# Patient Record
Sex: FEMALE | Race: OTHER | HISPANIC OR LATINO | URBAN - METROPOLITAN AREA
[De-identification: names, ages, dates, MRNs, and addresses within clinical notes are randomized per-mention and may not be internally consistent; named-entity substitution may affect disease eponyms.]

---

## 2013-02-06 LAB — HCV AB SER-ACNC: NEGATIVE

## 2013-08-27 LAB — EXTERNAL HIV CONFIRMATION: NORMAL

## 2020-07-13 ENCOUNTER — HOSPITAL ENCOUNTER (EMERGENCY)
Facility: HOSPITAL | Age: 30
Discharge: HOME | End: 2020-07-13
Attending: EMERGENCY MEDICINE
Payer: COMMERCIAL

## 2020-07-13 VITALS
SYSTOLIC BLOOD PRESSURE: 131 MMHG | OXYGEN SATURATION: 100 % | TEMPERATURE: 96.9 F | WEIGHT: 188.93 LBS | HEART RATE: 76 BPM | DIASTOLIC BLOOD PRESSURE: 84 MMHG | RESPIRATION RATE: 18 BRPM

## 2020-07-13 DIAGNOSIS — Z20.822 CLOSE EXPOSURE TO COVID-19 VIRUS: ICD-10-CM

## 2020-07-13 DIAGNOSIS — J02.9 SORE THROAT: ICD-10-CM

## 2020-07-13 DIAGNOSIS — R05.9 COUGH: Primary | ICD-10-CM

## 2020-07-13 LAB — SARS-COV-2 RNA RESP QL NAA+PROBE: NEGATIVE

## 2020-07-13 PROCEDURE — 99283 EMERGENCY DEPT VISIT LOW MDM: CPT

## 2020-07-13 PROCEDURE — 99282 EMERGENCY DEPT VISIT SF MDM: CPT | Performed by: EMERGENCY MEDICINE

## 2020-07-13 PROCEDURE — 87635 SARS-COV-2 COVID-19 AMP PRB: CPT | Performed by: EMERGENCY MEDICINE

## 2020-07-13 NOTE — ED PROVIDER NOTES
History  Chief Complaint   Patient presents with    Generalized Body Aches     requesting COVID19 testing, sore throat, body aches, headache  58-year-old female presents for evaluation possible COVID-19  The patient states that she had close contact to somebody who tested positive for COVID as recently as 5 days ago  The patient has had sore throat, body aches and headache  The patient states that she also has a mild cough  The patient denies shortness of breath or fevers  Nothing makes her symptoms better or worse   used: Yes        None       History reviewed  No pertinent past medical history  History reviewed  No pertinent surgical history  History reviewed  No pertinent family history  I have reviewed and agree with the history as documented  E-Cigarette/Vaping     E-Cigarette/Vaping Substances     Social History     Tobacco Use    Smoking status: Never Smoker    Smokeless tobacco: Never Used   Substance Use Topics    Alcohol use: Not Currently     Frequency: Never    Drug use: Never       Review of Systems   Constitutional: Negative for chills and fever  HENT: Positive for sore throat  Respiratory: Positive for cough  Negative for shortness of breath  All other systems reviewed and are negative  Physical Exam  Physical Exam   Constitutional: She appears well-developed and well-nourished  No distress  HENT:   Head: Normocephalic and atraumatic  Right Ear: External ear normal    Left Ear: External ear normal    Mouth/Throat: Oropharynx is clear and moist  No oropharyngeal exudate  Eyes: No scleral icterus  Neck: Normal range of motion  Cardiovascular: Normal rate, regular rhythm and normal heart sounds  Pulmonary/Chest: Effort normal  No respiratory distress  Musculoskeletal: Normal range of motion  Neurological: She is alert  Skin: Skin is warm and dry  No rash noted  Psychiatric: She has a normal mood and affect     Nursing note and vitals reviewed  Vital Signs  ED Triage Vitals   Temperature Pulse Respirations Blood Pressure SpO2   07/13/20 1208 07/13/20 1208 07/13/20 1208 07/13/20 1221 07/13/20 1208   (!) 96 9 °F (36 1 °C) 101 18 125/60 99 %      Temp Source Heart Rate Source Patient Position - Orthostatic VS BP Location FiO2 (%)   07/13/20 1208 07/13/20 1208 07/13/20 1427 07/13/20 1427 --   Temporal Monitor Lying Right arm       Pain Score       07/13/20 1208       8           Vitals:    07/13/20 1208 07/13/20 1221 07/13/20 1427   BP:  125/60 131/84   Pulse: 101  76   Patient Position - Orthostatic VS:   Lying         Visual Acuity      ED Medications  Medications - No data to display    Diagnostic Studies  Results Reviewed     Procedure Component Value Units Date/Time    Novel Coronavirus [facility] [[id number]]  (Normal) Collected:  07/13/20 1429    Lab Status:  Final result Specimen:  Nares from Nose Updated:  07/13/20 1533     SARS-CoV-2 Negative    Narrative: The specimen collection materials, transport medium, and/or testing methodology utilized in the production of these test results have been proven to be reliable in a limited validation with an abbreviated program under the Emergency Utilization Authorization provided by the FDA  Testing reported as "Presumptive positive" will be confirmed with secondary testing with a reference laboratory to ensure result accuracy  Clinical caution and judgement should be used with the interpretation of these results with consideration of the clinical impression and other laboratory testing  Testing reported as "Positive" or "Negative" has been proven to be accurate according to standard laboratory validation requirements  All testing is performed with control materials showing appropriate reactivity at standard intervals                     No orders to display              Procedures  Procedures         ED Course       US AUDIT      Most Recent Value   Initial Alcohol Screen: US AUDIT-C    1  How often do you have a drink containing alcohol?  0 Filed at: 07/13/2020 1209   2  How many drinks containing alcohol do you have on a typical day you are drinking? 0 Filed at: 07/13/2020 1209   3b  FEMALE Any Age, or MALE 65+: How often do you have 4 or more drinks on one occassion? 0 Filed at: 07/13/2020 1209   Audit-C Score  0 Filed at: 07/13/2020 1209                  ELICEO/DAST-10      Most Recent Value   How many times in the past year have you    Used an illegal drug or used a prescription medication for non-medical reasons? Never Filed at: 07/13/2020 1209                                MDM  Number of Diagnoses or Management Options  Close exposure to COVID-19 virus:   Cough:   Sore throat:   Diagnosis management comments: Patient with likely viral syndrome  The patient will be tested for COVID as she has a close exposure  She was instructed that she will need a self isolate for minimum of weak even if the COVID-19 test is negative  She will need to s self quarantine for 2 weeks if positive  The patient was provided with these discharge instructions and warnings utilizing [employer]   The patient (and any family present) verbalized understanding of the discharge instructions and warnings that would necessitate return to the Emergency Department      All questions were answered prior to discharge utilizing  services    Patient informed of negative COVID swab       Amount and/or Complexity of Data Reviewed  Clinical lab tests: ordered and reviewed  Review and summarize past medical records: yes          Disposition  Final diagnoses:   Cough   Sore throat   Close exposure to COVID-19 virus     Time reflects when diagnosis was documented in both MDM as applicable and the Disposition within this note     Time User Action Codes Description Comment    7/13/2020  2:20 PM August Escobedo Add [R05] Cough     7/13/2020  2:20 PM Rola NICOLE Add [J02 9] Sore throat     7/13/2020  2:21 PM August Escobedo Add [Z20 828] Close exposure to COVID-19 virus       ED Disposition     ED Disposition Condition Date/Time Comment    Discharge Stable Mon Jul 13, 2020  2:20 PM John Canada discharge to home/self care  Follow-up Information    None         There are no discharge medications for this patient  No discharge procedures on file      PDMP Review     None          ED Provider  Electronically Signed by           Chery Dixon DO  07/13/20 2410

## 2021-12-14 ENCOUNTER — TELEPHONE (OUTPATIENT)
Dept: ADMINISTRATIVE | Facility: OTHER | Age: 31
End: 2021-12-14

## 2021-12-16 ENCOUNTER — OFFICE VISIT (OUTPATIENT)
Dept: FAMILY MEDICINE CLINIC | Facility: CLINIC | Age: 31
End: 2021-12-16
Payer: COMMERCIAL

## 2021-12-16 VITALS
WEIGHT: 196 LBS | DIASTOLIC BLOOD PRESSURE: 80 MMHG | OXYGEN SATURATION: 99 % | TEMPERATURE: 97.1 F | HEART RATE: 90 BPM | BODY MASS INDEX: 38.48 KG/M2 | HEIGHT: 60 IN | SYSTOLIC BLOOD PRESSURE: 122 MMHG | RESPIRATION RATE: 18 BRPM

## 2021-12-16 DIAGNOSIS — Z23 ENCOUNTER FOR IMMUNIZATION: ICD-10-CM

## 2021-12-16 DIAGNOSIS — Z00.00 ANNUAL PHYSICAL EXAM: Primary | ICD-10-CM

## 2021-12-16 PROCEDURE — 99385 PREV VISIT NEW AGE 18-39: CPT | Performed by: PHYSICIAN ASSISTANT

## 2021-12-16 PROCEDURE — 90715 TDAP VACCINE 7 YRS/> IM: CPT

## 2021-12-16 PROCEDURE — 90471 IMMUNIZATION ADMIN: CPT

## 2021-12-21 ENCOUNTER — CONSULT (OUTPATIENT)
Dept: FAMILY MEDICINE CLINIC | Facility: CLINIC | Age: 31
End: 2021-12-21
Payer: COMMERCIAL

## 2021-12-21 VITALS
WEIGHT: 195.6 LBS | HEART RATE: 98 BPM | OXYGEN SATURATION: 99 % | RESPIRATION RATE: 20 BRPM | HEIGHT: 60 IN | DIASTOLIC BLOOD PRESSURE: 72 MMHG | TEMPERATURE: 96 F | SYSTOLIC BLOOD PRESSURE: 118 MMHG | BODY MASS INDEX: 38.4 KG/M2

## 2021-12-21 DIAGNOSIS — Z01.818 PRE-OPERATIVE CLEARANCE: Primary | ICD-10-CM

## 2021-12-21 DIAGNOSIS — E66.09 CLASS 2 OBESITY DUE TO EXCESS CALORIES WITHOUT SERIOUS COMORBIDITY WITH BODY MASS INDEX (BMI) OF 38.0 TO 38.9 IN ADULT: ICD-10-CM

## 2021-12-21 PROCEDURE — 99243 OFF/OP CNSLTJ NEW/EST LOW 30: CPT | Performed by: PHYSICIAN ASSISTANT

## 2021-12-21 PROCEDURE — 93000 ELECTROCARDIOGRAM COMPLETE: CPT | Performed by: PHYSICIAN ASSISTANT

## 2021-12-21 NOTE — PROGRESS NOTES
Presurgical Evaluation    Subjective:      Patient ID: John Canada is a 33 y o  female  Chief Complaint   Patient presents with    Pre-op Exam       Pt presents to the office today for preop clearance for sleeve gastrectomy scheduled for 1/17/22  Pt is electing to undergo the planned procedure with an understanding that all surgery has inherent risk  They have worked with their surgeon and failed conservative treatment measures  Today they present for preoperative risk assessment and recommendations for optimization in preparation for surgery  Pt is Chadian speaking only, video  [id number] provided interpretive services throughout encounter  The following portions of the patient's history were reviewed and updated as appropriate: allergies, current medications, past family history, past medical history, past social history, past surgical history and problem list     Procedure date: January 17, 2022    Surgeon:  Edwar Hoffman MD  Planned procedure:  Sleeve gastrectomy  Diagnosis for procedure:  Class 2 obesity with BMI of 38 0 to 38 9 in adult    Prior anesthesia: Yes   General; Complications:  None / Tolerated well    CAD History: None    Pulmonary History: None    Renal history: None    Diabetes History:  None     Neurological History: None     On Immunosuppressant meds/biologics: No      Review of Systems   Constitutional: Negative for chills, fatigue and fever  HENT: Negative for congestion, rhinorrhea, sinus pressure, sinus pain, sneezing and sore throat  Respiratory: Negative for cough, shortness of breath and wheezing  Cardiovascular: Negative for chest pain, palpitations and leg swelling  Gastrointestinal: Negative for abdominal pain, constipation, diarrhea, nausea and vomiting  Genitourinary: Negative for dysuria and hematuria  Musculoskeletal: Negative for arthralgias, back pain, myalgias, neck pain and neck stiffness     Neurological: Negative for dizziness, seizures and headaches  No current outpatient medications on file  No current facility-administered medications for this visit  Allergies on file:   Patient has no known allergies  Patient Active Problem List   Diagnosis    Pre-operative clearance    Class 2 obesity due to excess calories without serious comorbidity with body mass index (BMI) of 38 0 to 38 9 in adult        History reviewed  No pertinent past medical history  History reviewed  No pertinent surgical history  Family History   Problem Relation Age of Onset    Diabetes Mother     Hypertension Mother     Diabetes Father        Social History     Tobacco Use    Smoking status: Never Smoker    Smokeless tobacco: Never Used   Substance Use Topics    Alcohol use: Not Currently    Drug use: Never       Objective:    Vitals:    12/21/21 0913   BP: 118/72   BP Location: Left arm   Patient Position: Sitting   Cuff Size: Standard   Pulse: 98   Resp: 20   Temp: (!) 96 °F (35 6 °C)   TempSrc: Temporal   SpO2: 99%   Weight: 88 7 kg (195 lb 9 6 oz)   Height: 5' (1 524 m)        Physical Exam  Vitals reviewed  Constitutional:       General: She is not in acute distress  Appearance: She is obese  She is not toxic-appearing  HENT:      Head: Normocephalic and atraumatic  Eyes:      Extraocular Movements: Extraocular movements intact  Conjunctiva/sclera: Conjunctivae normal    Cardiovascular:      Rate and Rhythm: Normal rate and regular rhythm  Pulses: Normal pulses  Heart sounds: Normal heart sounds  No murmur heard  No friction rub  No gallop  Pulmonary:      Effort: Pulmonary effort is normal  No respiratory distress  Breath sounds: Normal breath sounds  No stridor  No wheezing, rhonchi or rales  Musculoskeletal:         General: Normal range of motion  Cervical back: Normal range of motion  Right lower leg: No edema  Left lower leg: No edema     Skin:     General: Skin is warm and dry    Neurological:      Mental Status: She is alert and oriented to person, place, and time  Gait: Gait normal    Psychiatric:         Mood and Affect: Mood normal          Behavior: Behavior normal          Thought Content: Thought content normal            Preop labs/testing available and reviewed: yes               EKG yes    Echo no    Stress test/cath no    PFT/[place] no    Functional capacity: Shovel snow                          6 Mets   Pick the highest level patient can comfortably perform   4 mets or greater for surgery    RCRI  High Risk surgery? 1 Point  CAD History:         1 Point   MI; Positive Stress Test; CP due to Mi;  Nitrate Usage to control Angina; Pathologic Q wave on EKG  CHF Active:         1 Point   Pulm Edema; Paroxysmal Nocturnal Dyspnea;  Bibasilar Rales (crackles);S3; CHF on CXR  Cerebrovascular Disease (TIA or CVA):     1 Point  DM on Insulin:        1 Point  Serum Creat >2 0 mg/dl:       1 Point          Total Points: 0     Scorin: Class I, Very Low Risk (0 4%)     1: Class II, Low risk (0 9%)     2: Class III Moderate (6 6%)     3: Class IV High (>11%)      NATHALIE Risk:  GFR:    94      Assessment/Plan:    Patient is medically optimized (cleared) for the planned procedure  Further testing/evaluation is not required  Postop concerns: no    Problem List Items Addressed This Visit        Other    Pre-operative clearance - Primary     Pt feels well and denies cardiovascular and pulmonary complaints that this time  Denies shortness of breath with exertion such as walking or climbing stairs  Pt denies prior surgeries, however pt had an EGD in the past and had no problem with the anesthesia utilized for that procedure  CBC and CMP completed 21 are within acceptable limits  EKG completed today is normal      Jermaine Ramos is medically cleared for surgery               Relevant Orders    POCT ECG (Completed)    CBC and differential    Comprehensive metabolic panel    Class 2 obesity due to excess calories without serious comorbidity with body mass index (BMI) of 38 0 to 38 9 in adult           Diagnoses and all orders for this visit:    Pre-operative clearance  -     POCT ECG  -     CBC and differential; Future  -     Comprehensive metabolic panel;  Future    Class 2 obesity due to excess calories without serious comorbidity with body mass index (BMI) of 38 0 to 38 9 in adult          No outpatient medications have been marked as taking for the 12/21/21 encounter (Appointment) with Chino Gardner PA-C

## 2022-01-04 NOTE — ASSESSMENT & PLAN NOTE
Pt feels well and denies cardiovascular and pulmonary complaints that this time  Denies shortness of breath with exertion such as walking or climbing stairs  Pt denies prior surgeries, however pt had an EGD in the past and had no problem with the anesthesia utilized for that procedure  CBC and CMP completed 12/29/21 are within acceptable limits  EKG completed today is normal      Aidan [place] is medically cleared for surgery

## 2023-01-18 ENCOUNTER — OFFICE VISIT (OUTPATIENT)
Dept: FAMILY MEDICINE CLINIC | Facility: CLINIC | Age: 33
End: 2023-01-18

## 2023-01-18 VITALS
TEMPERATURE: 98.4 F | SYSTOLIC BLOOD PRESSURE: 115 MMHG | WEIGHT: 145.4 LBS | DIASTOLIC BLOOD PRESSURE: 80 MMHG | BODY MASS INDEX: 28.54 KG/M2 | HEART RATE: 78 BPM | HEIGHT: 60 IN | OXYGEN SATURATION: 99 % | RESPIRATION RATE: 20 BRPM

## 2023-01-18 DIAGNOSIS — R07.81 PLEURODYNIA: ICD-10-CM

## 2023-01-18 DIAGNOSIS — Z00.00 ANNUAL PHYSICAL EXAM: Primary | ICD-10-CM

## 2023-01-18 DIAGNOSIS — Z98.84 HISTORY OF BARIATRIC SURGERY: ICD-10-CM

## 2023-01-18 DIAGNOSIS — Z13.6 ENCOUNTER FOR SCREENING FOR CARDIOVASCULAR DISORDERS: ICD-10-CM

## 2023-01-18 DIAGNOSIS — G47.33 OSA (OBSTRUCTIVE SLEEP APNEA): ICD-10-CM

## 2023-01-18 PROBLEM — Z01.818 PRE-OPERATIVE CLEARANCE: Status: RESOLVED | Noted: 2021-12-21 | Resolved: 2023-01-18

## 2023-01-18 NOTE — PROGRESS NOTES
ADULT ANNUAL [address] [facility] PRIMARY CARE [facility]    NAME: Kaci Singh  AGE: 27 y o  SEX: female  : 1990     DATE: 2023     Assessment and Plan:     Problem List Items Addressed This Visit    None      Immunizations and preventive care screenings were discussed with patient today  Appropriate education was printed on patient's after visit summary  Counseling:  Alcohol/drug use: discussed moderation in alcohol intake, the recommendations for healthy alcohol use, and avoidance of illicit drug use  Dental Health: discussed importance of regular tooth brushing, flossing, and dental visits  Injury prevention: discussed safety/seat belts, safety helmets, smoke detectors, carbon dioxide detectors, and smoking near bedding or upholstery  Sexual health: discussed sexually transmitted diseases, partner selection, use of condoms, avoidance of unintended pregnancy, and contraceptive alternatives  · Exercise: the importance of regular exercise/physical activity was discussed  Recommend exercise 3-5 times per week for at least 30 minutes  No follow-ups on file  Chief Complaint:     Chief Complaint   Patient presents with   • Physical Exam      History of Present Illness:     Adult Annual Physical   Patient here for a comprehensive physical exam  The patient reports problems - shortness of breath  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  She is concerned for her risk of exposure to the chemical fumes and would like a chest x-ray  She also reports intermittent shortness of breath and pain with breathing in her back at that times  Random and without known triggers  She is sexually active and has never been pregnant  She is looking to become pregnant  She started the process of fertility treatment in her country but has not seen a fertility [place] in years state    She was previously following with Dr Gemini Estes for bariatric and gynecological screening  Denies smoking, social alcohol use  History was conducted in Bhutanese without the use of   Diet and Physical Activity  · Diet/Nutrition: well balanced diet  · Exercise: no formal exercise  Depression Screening  PHQ-2/9 Depression Screening    Little interest or pleasure in doing things: 0 - not at all  Feeling down, depressed, or hopeless: 0 - not at all  PHQ-2 Score: 0  PHQ-2 Interpretation: Negative depression screen       General Health  · Sleep: gets 7-8 hours of sleep on average  · Hearing: normal - bilateral   · Vision: wears glasses  · Dental: brushes teeth twice daily  /GYN Health  · Last menstrual period: 1/13/23  · Contraceptive method: none  · History of STDs?: no   Normal pap in 2022   Review of Systems:     Review of Systems   Constitutional: Negative for chills and fever  HENT: Negative for ear pain and sore throat  Eyes: Negative for pain and visual disturbance  Respiratory: Positive for chest tightness (Pain with breathing) and shortness of breath  Negative for cough  Cardiovascular: Negative for chest pain and palpitations  Gastrointestinal: Negative for abdominal pain and vomiting  Genitourinary: Negative for dysuria and hematuria  Musculoskeletal: Negative for arthralgias and back pain  Skin: Negative for color change and rash  Neurological: Negative for seizures and syncope  All other systems reviewed and are negative       Past Medical History:     Past Medical History:   Diagnosis Date   • Hepatic steatosis       Past Surgical History:     Past Surgical History:   Procedure Laterality Date   • GASTRECTOMY SLEEVE LAPAROSCOPIC        Social History:     Social History     Socioeconomic History   • Marital status: /Civil Union     Spouse name: None   • Number of children: None   • Years of education: None   • Highest education level: None   Occupational History   • None   Tobacco Use   • Smoking status: Never   • Smokeless tobacco: Never   Substance and Sexual Activity   • Alcohol use: Not Currently   • Drug use: Never   • Sexual activity: Yes     Partners: Male     Birth control/protection: None   Other Topics Concern   • None   Social History Narrative   • None     Social Determinants of Health     Financial Resource Strain: Not on file   Food Insecurity: Not on file   Transportation Needs: Not on file   Physical Activity: Not on file   Stress: Not on file   Social Connections: Not on file   Intimate Partner Violence: Not on file   Housing Stability: Not on file      Family History:     Family History   Problem Relation Age of Onset   • Diabetes Mother    • Hypertension Mother    • Diabetes Father       Current Medications:     No current outpatient medications on file  No current facility-administered medications for this visit  Allergies:     No Known Allergies   Physical Exam:     /80 (BP Location: Left arm, Patient Position: Sitting, Cuff Size: Standard)   Pulse 78   Temp 98 4 °F (36 9 °C) (Tympanic)   Resp 20   Ht 5' (1 524 m)   Wt 66 kg (145 lb 6 4 oz)   SpO2 99%   BMI 28 40 kg/m²     Physical Exam  Vitals and nursing note reviewed  Constitutional:       General: She is not in acute distress  Appearance: She is well-developed  HENT:      Head: Normocephalic and atraumatic  Right Ear: Tympanic membrane, ear canal and external ear normal       Left Ear: Tympanic membrane, ear canal and external ear normal    Eyes:      Conjunctiva/sclera: Conjunctivae normal    Cardiovascular:      Rate and Rhythm: Normal rate and regular rhythm  Heart sounds: No murmur heard  Pulmonary:      Effort: Pulmonary effort is normal  No respiratory distress  Breath sounds: Normal breath sounds  Abdominal:      Palpations: Abdomen is soft  Tenderness: There is no abdominal tenderness  Musculoskeletal:         General: No swelling  Cervical back: Neck supple  Skin:     General: Skin is warm and dry  Capillary Refill: Capillary refill takes less than 2 seconds  Neurological:      Mental Status: She is alert     Psychiatric:         Mood and Affect: Mood normal           Johnson Patten PA-C   [address]

## 2023-01-19 PROBLEM — R07.81 PLEURODYNIA: Status: ACTIVE | Noted: 2023-01-19

## 2023-01-19 NOTE — ASSESSMENT & PLAN NOTE
Unable to review records of previous sleep study  Patient reports mild VAISHALI  No daytime sleepiness or apneic episodes  Improved due to weight loss after bariatric surgery  Continue with weight management and consider repeat sleep study

## 2023-01-19 NOTE — ASSESSMENT & PLAN NOTE
BMI Counseling: Body mass index is 28 4 kg/m²  The BMI is above normal  Nutrition recommendations include reducing portion sizes, decreasing overall calorie intake and 3-5 servings of fruits/vegetables daily  Exercise recommendations include exercising 3-5 times per week and strength training exercises  1 year since previous bariatric surgery  With 50 pound weight loss since then  Continue with weight loss, lifestyle modifications, diet and exercise

## 2023-01-19 NOTE — ASSESSMENT & PLAN NOTE
With 50 pound weight loss since gastric sleeve surgery in 1/2022  Continue diet and exercise  Continue follow-up bariatric  Check vitamin levels

## 2023-01-19 NOTE — ASSESSMENT & PLAN NOTE
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

## 2023-01-31 PROBLEM — D50.8 IRON DEFICIENCY ANEMIA SECONDARY TO INADEQUATE DIETARY IRON INTAKE: Status: ACTIVE | Noted: 2023-01-31

## 2024-01-17 ENCOUNTER — TELEPHONE (OUTPATIENT)
Dept: FAMILY MEDICINE CLINIC | Facility: CLINIC | Age: 34
End: 2024-01-17

## 2024-02-27 ENCOUNTER — TELEPHONE (OUTPATIENT)
Age: 34
End: 2024-02-27

## 2024-02-27 ENCOUNTER — OFFICE VISIT (OUTPATIENT)
Dept: FAMILY MEDICINE CLINIC | Facility: CLINIC | Age: 34
End: 2024-02-27

## 2024-02-27 VITALS
DIASTOLIC BLOOD PRESSURE: 62 MMHG | RESPIRATION RATE: 17 BRPM | SYSTOLIC BLOOD PRESSURE: 112 MMHG | OXYGEN SATURATION: 99 % | BODY MASS INDEX: 27.68 KG/M2 | HEIGHT: 61 IN | WEIGHT: 146.6 LBS | HEART RATE: 76 BPM | TEMPERATURE: 98.4 F

## 2024-02-27 DIAGNOSIS — E66.3 OVERWEIGHT (BMI 25.0-29.9): ICD-10-CM

## 2024-02-27 DIAGNOSIS — Z00.00 ANNUAL PHYSICAL EXAM: Primary | ICD-10-CM

## 2024-02-27 DIAGNOSIS — Z13.6 SCREENING FOR CARDIOVASCULAR CONDITION: ICD-10-CM

## 2024-02-27 DIAGNOSIS — D50.8 IRON DEFICIENCY ANEMIA SECONDARY TO INADEQUATE DIETARY IRON INTAKE: ICD-10-CM

## 2024-02-27 PROBLEM — R07.81 PLEURODYNIA: Status: RESOLVED | Noted: 2023-01-19 | Resolved: 2024-02-27

## 2024-02-27 PROBLEM — G47.33 OSA (OBSTRUCTIVE SLEEP APNEA): Status: RESOLVED | Noted: 2022-01-14 | Resolved: 2024-02-27

## 2024-02-27 PROCEDURE — 99395 PREV VISIT EST AGE 18-39: CPT | Performed by: PHYSICIAN ASSISTANT

## 2024-02-27 RX ORDER — MULTIVITAMIN
1 CAPSULE ORAL DAILY
COMMUNITY

## 2024-02-27 NOTE — PATIENT INSTRUCTIONS
Tambien, le recomiendo que aumente los siguientes alimentos en olivera dieta:  · Tofu  · Frijoles y lentejas  · Verduras de hojas dianne oscuro  Combinar sanchez de brooke con alimentos ricos en vitamina C puede ayudar a absorber el brooke por ejemplo:  · Frutas cítricas andrzej las naranjas  · Bayas  · Papaya  · Tomates  · Batatas  · Brócoli  · Repollo  · Verduras de hojas dianne oscuro  https://medlineplus.gov/[place]/ency/article/145396.htm

## 2024-02-27 NOTE — ASSESSMENT & PLAN NOTE
Component  Ref Range & Units 1/30/23  8:24 AM   Hemoglobin  11.5 - 14.5 g/dL 11.0 Low    Hematocrit  35.0 - 43.0 % 33.9 Low    WBC  4.0 - 10.0 thou/cmm 6.8   RBC  3.70 - 4.70 mill/cmm 4.33   Platelet Count  140 - 350 thou/cmm 320   MPV  7.5 - 11.3 fL 7.8   MCV  80 - 100 fL 78 Low    MCH  26.0 - 34.0 pg 25.4 Low    MCHC  32.0 - 37.0 g/dL 32.5   RDW  12.0 - 16.0 % 15.5   Differential Type AUTO   Absolute Neutrophils  1.8 - 7.8 thou/cmm 3.5   Absolute Lymphocytes  1.0 - 3.0 thou/cmm 2.6   Absolute Monocytes  0.3 - 1.0 thou/cmm 0.6   Absolute Eosinophils  0.0 - 0.5 thou/cmm 0.1   Absolute Basophils  0.0 - 0.1 thou/cmm 0.0   Neutrophils  % 51   Lymphocytes  % 39   Monocytes  % 9   Eosinophils  % 1   Basophils  % 0     Ferrous sulfate caused constipation. Recommend slow Fe instead. Increase iron in diet.

## 2024-02-27 NOTE — TELEPHONE ENCOUNTER
Patient calling to inquire about self-pay price. She lost her health insurance but does not want to cancel today's visit.  service conferenced in Ke #[id number]. Advised patient of the self pay established patient charge and minimum $25 payment in office. Estimate and self pay acknowledgement form added to chart. Please review with patient in person at check in. She thanks us for our help!

## 2024-02-27 NOTE — ASSESSMENT & PLAN NOTE
History of gastric sleeve. Continue with weight loss.     BMI Counseling: Body mass index is 27.7 kg/m². The BMI is above normal. Nutrition recommendations include reducing portion sizes, decreasing overall calorie intake, and 3-5 servings of fruits/vegetables daily. Exercise recommendations include exercising 3-5 times per week and strength training exercises.

## 2024-02-27 NOTE — PROGRESS NOTES
ADULT ANNUAL PHYSICAL  [facility]    NAME: Anel Wild  AGE: 33 y.o. SEX: female  : 1990     DATE: 2024     Assessment and Plan:     Problem List Items Addressed This Visit        Other    Iron deficiency anemia secondary to inadequate dietary iron intake     Component  Ref Range & Units 23  8:24 AM   Hemoglobin  11.5 - 14.5 g/dL 11.0 Low    Hematocrit  35.0 - 43.0 % 33.9 Low    WBC  4.0 - 10.0 thou/cmm 6.8   RBC  3.70 - 4.70 mill/cmm 4.33   Platelet Count  140 - 350 thou/cmm 320   MPV  7.5 - 11.3 fL 7.8   MCV  80 - 100 fL 78 Low    MCH  26.0 - 34.0 pg 25.4 Low    MCHC  32.0 - 37.0 g/dL 32.5   RDW  12.0 - 16.0 % 15.5   Differential Type AUTO   Absolute Neutrophils  1.8 - 7.8 thou/cmm 3.5   Absolute Lymphocytes  1.0 - 3.0 thou/cmm 2.6   Absolute Monocytes  0.3 - 1.0 thou/cmm 0.6   Absolute Eosinophils  0.0 - 0.5 thou/cmm 0.1   Absolute Basophils  0.0 - 0.1 thou/cmm 0.0   Neutrophils  % 51   Lymphocytes  % 39   Monocytes  % 9   Eosinophils  % 1   Basophils  % 0     Ferrous sulfate caused constipation. Recommend slow Fe instead. Increase iron in diet.          Relevant Orders    CBC and differential    Iron, TIBC and Ferritin Panel    Overweight (BMI 25.0-29.9)     History of gastric sleeve. Continue with weight loss.     BMI Counseling: Body mass index is 27.7 kg/m². The BMI is above normal. Nutrition recommendations include reducing portion sizes, decreasing overall calorie intake, and 3-5 servings of fruits/vegetables daily. Exercise recommendations include exercising 3-5 times per week and strength training exercises.        Other Visit Diagnoses     Annual physical exam    -  Primary    Screening for cardiovascular condition        Relevant Orders    CBC and differential    Comprehensive metabolic panel            Immunizations and preventive care screenings were discussed with patient today. Appropriate  education was printed on patient's after visit summary.    Counseling:  Alcohol/drug use: discussed moderation in alcohol intake, the recommendations for healthy alcohol use, and avoidance of illicit drug use.  Dental Health: discussed importance of regular tooth brushing, flossing, and dental visits.  Injury prevention: discussed safety/seat belts, safety helmets, smoke detectors, carbon dioxide detectors, and smoking near bedding or upholstery.  Sexual health: discussed sexually transmitted diseases, partner selection, use of condoms, avoidance of unintended pregnancy, and contraceptive alternatives.  Exercise: the importance of regular exercise/physical activity was discussed. Recommend exercise 3-5 times per week for at least 30 minutes.          Return in about 1 year (around 2/27/2025) for Annual physical.     Chief Complaint:     Chief Complaint   Patient presents with   • Physical Exam      History of Present Illness:     Adult Annual Physical   Patient here for a comprehensive physical exam. The patient reports no problems.  insurance issue. Gap in insurance currently. Doing well. Works seated most of the day, no exercise. No smoking or ETOH use. No contraception.     History was conducted in [place] without the use of .    Diet and Physical Activity  Diet/Nutrition: well balanced diet.   Exercise: no formal exercise.      Depression Screening  PHQ-2/9 Depression Screening    Little interest or pleasure in doing things: 0 - not at all  Feeling down, depressed, or hopeless: 0 - not at all  PHQ-2 Score: 0  PHQ-2 Interpretation: Negative depression screen       General Health  Sleep: sleeps well.   Hearing: normal - bilateral.  Vision: goes for regular eye exams.   Dental: no dental visits for >1 year and brushes teeth twice daily.       /GYN Health  Follows with gynecology? yes Dr. Ellis  Last menstrual period: 2/9/24  Contraceptive method:  none .  History of STDs?: no.     Advanced  Care Planning  Do you have an advanced directive? no  Do you have a durable medical power of ? no  ACP document given to the patient? no      Review of Systems:     Review of Systems   Constitutional:  Negative for chills and fever.   HENT:  Negative for ear pain and sore throat.    Eyes:  Negative for pain and visual disturbance.   Respiratory:  Negative for cough and shortness of breath.    Cardiovascular:  Negative for chest pain and palpitations.   Gastrointestinal:  Negative for abdominal pain and vomiting.   Genitourinary:  Negative for dysuria and hematuria.   Musculoskeletal:  Negative for arthralgias and back pain.   Skin:  Negative for color change and rash.   Neurological:  Negative for seizures and syncope.   All other systems reviewed and are negative.     Past Medical History:     Past Medical History:   Diagnosis Date   • Hepatic steatosis    • VAISHALI (obstructive sleep apnea)    • Pleurodynia       Past Surgical History:     Past Surgical History:   Procedure Laterality Date   • GASTRECTOMY SLEEVE LAPAROSCOPIC  01/17/2022      Social History:     Social History     Socioeconomic History   • Marital status: /Civil Union     Spouse name: None   • Number of children: None   • Years of education: None   • Highest education level: None   Occupational History   • None   Tobacco Use   • Smoking status: Never   • Smokeless tobacco: Never   Vaping Use   • Vaping status: Never Used   Substance and Sexual Activity   • Alcohol use: Not Currently   • Drug use: Never   • Sexual activity: Yes     Partners: Male     Birth control/protection: None   Other Topics Concern   • None   Social History Narrative   • None     Social Determinants of Health     Financial Resource Strain: Not on file   Food Insecurity: Not on file   Transportation Needs: Not on file   Physical Activity: Not on file   Stress: Not on file   Social Connections: Not on file   Intimate Partner Violence: Not on file   Housing Stability:  "Not on file      Family History:     Family History   Problem Relation Age of Onset   • Diabetes Mother    • Hypertension Mother    • Diabetes Father       Current Medications:     Current Outpatient Medications   Medication Sig Dispense Refill   • Cholecalciferol (Vitamin D3) 50 MCG (2000 UT) capsule Take 1 capsule (2,000 Units total) by mouth daily 90 capsule 3   • Multiple Vitamin (multivitamin) capsule Take 1 capsule by mouth daily       No current facility-administered medications for this visit.      Allergies:     No Known Allergies   Physical Exam:     /62 (BP Location: Left arm, Patient Position: Sitting, Cuff Size: Standard)   Pulse 76   Temp 98.4 °F (36.9 °C) (Tympanic)   Resp 17   Ht 5' 1\" (1.549 m)   Wt 66.5 kg (146 lb 9.6 oz)   SpO2 99%   BMI 27.70 kg/m²     Physical Exam  Vitals and nursing note reviewed.   Constitutional:       General: She is not in acute distress.     Appearance: She is well-developed.   HENT:      Head: Normocephalic and atraumatic.      Right Ear: Tympanic membrane, ear canal and external ear normal.      Left Ear: Tympanic membrane, ear canal and external ear normal.   Eyes:      Conjunctiva/sclera: Conjunctivae normal.   Cardiovascular:      Rate and Rhythm: Normal rate and regular rhythm.      Heart sounds: No murmur heard.  Pulmonary:      Effort: Pulmonary effort is normal. No respiratory distress.      Breath sounds: Normal breath sounds.   Abdominal:      Palpations: Abdomen is soft.      Tenderness: There is no abdominal tenderness.   Musculoskeletal:         General: No swelling.      Cervical back: Neck supple.   Skin:     General: Skin is warm and dry.      Capillary Refill: Capillary refill takes less than 2 seconds.   Neurological:      Mental Status: She is alert.   Psychiatric:         Mood and Affect: Mood normal.          Danielle Doshi PA-C   [facility]"

## 2024-03-14 LAB
ALBUMIN SERPL-MCNC: 4 G/DL (ref 3.5–5.7)
ALP SERPL-CCNC: 48 U/L (ref 35–120)
ALT SERPL-CCNC: 12 U/L
ANION GAP SERPL CALCULATED.3IONS-SCNC: 7 MMOL/L (ref 3–11)
AST SERPL-CCNC: 14 U/L
BASOPHILS # BLD AUTO: 0 THOU/CMM (ref 0–0.1)
BASOPHILS NFR BLD AUTO: 1 %
BILIRUB SERPL-MCNC: 0.3 MG/DL (ref 0.2–1)
BUN SERPL-MCNC: 12 MG/DL (ref 7–25)
CALCIUM SERPL-MCNC: 9.1 MG/DL (ref 8.5–10.1)
CHLORIDE SERPL-SCNC: 106 MMOL/L (ref 100–109)
CO2 SERPL-SCNC: 27 MMOL/L (ref 21–31)
CREAT SERPL-MCNC: 0.81 MG/DL (ref 0.4–1.1)
CYTOLOGY CMNT CVX/VAG CYTO-IMP: NORMAL
DIFFERENTIAL METHOD BLD: ABNORMAL
EOSINOPHIL # BLD AUTO: 0.1 THOU/CMM (ref 0–0.5)
EOSINOPHIL NFR BLD AUTO: 1 %
ERYTHROCYTE [DISTWIDTH] IN BLOOD BY AUTOMATED COUNT: 17.7 % (ref 12–16)
FERRITIN SERPL-MCNC: 2 NG/ML (ref 11–306.8)
GFR/BSA.PRED SERPLBLD CYS-BASED-ARV: 97 ML/MIN/{1.73_M2}
GLUCOSE SERPL-MCNC: 93 MG/DL (ref 65–99)
HCT VFR BLD AUTO: 31.3 % (ref 35–43)
HGB BLD-MCNC: 9.9 G/DL (ref 11.5–14.5)
IRON SATN MFR SERPL: 4 % (ref 20–50)
IRON SERPL-MCNC: 20 UG/DL (ref 50–212)
LYMPHOCYTES # BLD AUTO: 2.3 THOU/CMM (ref 1–3)
LYMPHOCYTES NFR BLD AUTO: 40 %
MCH RBC QN AUTO: 23.8 PG (ref 26–34)
MCHC RBC AUTO-ENTMCNC: 31.8 G/DL (ref 32–37)
MCV RBC AUTO: 75 FL (ref 80–100)
MONOCYTES # BLD AUTO: 0.5 THOU/CMM (ref 0.3–1)
MONOCYTES NFR BLD AUTO: 9 %
NEUTROPHILS # BLD AUTO: 2.9 THOU/CMM (ref 1.8–7.8)
NEUTROPHILS NFR BLD AUTO: 49 %
PLATELET # BLD AUTO: 377 THOU/CMM (ref 140–350)
PMV BLD REES-ECKER: 7.8 FL (ref 7.5–11.3)
POTASSIUM SERPL-SCNC: 4.3 MMOL/L (ref 3.5–5.2)
PROT SERPL-MCNC: 6.8 G/DL (ref 6.3–8.3)
RBC # BLD AUTO: 4.18 MILL/CMM (ref 3.7–4.7)
SODIUM SERPL-SCNC: 140 MMOL/L (ref 135–145)
TIBC SERPL-MCNC: 505 UG/DL (ref 260–430)
TRANSFERRIN SERPL-MCNC: 361 MG/DL (ref 203–362)
WBC # BLD AUTO: 5.7 THOU/CMM (ref 4–10)

## 2024-08-30 ENCOUNTER — TELEPHONE (OUTPATIENT)
Dept: FAMILY MEDICINE CLINIC | Facility: CLINIC | Age: 34
End: 2024-08-30

## 2024-08-30 NOTE — TELEPHONE ENCOUNTER
Please have patient repeat labs nonfasting, last labs in 3/2024 - is she taking iron supplement? Consider iron infusions if no improvement   ----- Message -----   From: SYSTEM   Sent: 8/30/2024   1:45 AM EDT   To: Danielle Doshi PA-C